# Patient Record
Sex: FEMALE | Race: WHITE
[De-identification: names, ages, dates, MRNs, and addresses within clinical notes are randomized per-mention and may not be internally consistent; named-entity substitution may affect disease eponyms.]

---

## 2017-04-25 ENCOUNTER — HOSPITAL ENCOUNTER (OUTPATIENT)
Dept: HOSPITAL 58 - SURG | Age: 72
Discharge: HOME | End: 2017-04-25
Attending: INTERNAL MEDICINE

## 2017-04-25 VITALS — SYSTOLIC BLOOD PRESSURE: 106 MMHG | DIASTOLIC BLOOD PRESSURE: 67 MMHG | TEMPERATURE: 97 F

## 2017-04-25 VITALS — BODY MASS INDEX: 38.7 KG/M2

## 2017-04-25 DIAGNOSIS — Z09: Primary | ICD-10-CM

## 2017-04-25 DIAGNOSIS — D12.3: ICD-10-CM

## 2017-04-25 DIAGNOSIS — R13.10: ICD-10-CM

## 2017-04-25 DIAGNOSIS — Z86.010: ICD-10-CM

## 2017-04-25 DIAGNOSIS — D12.0: ICD-10-CM

## 2017-04-25 DIAGNOSIS — K63.89: ICD-10-CM

## 2017-04-25 DIAGNOSIS — D17.79: ICD-10-CM

## 2017-04-26 NOTE — OP
INDICATIONS FOR PROCEDURE:  71-year-old female presents for colonoscopy. She 
has a past history of colon polyps, the last colonoscopy five years ago. She 
also has intermittent dysphagia to solid foods. She is scheduled for endoscopy 
exam. 



MEDICATIONS:  SEE ANESTHESIA NOTES.



PROCEDURE:  

1.  ENDOSCOPY, AMERICAN DILATATION

2.  COLONOSCOPY, SNARE POLYPECTOMY.



REPORT:  The risks, benefits, alternatives and limitations were discussed in 
detail with the patient.  Informed consent was obtained.



After adequate sedation was achieved, the video endoscope was introduced in the 
posterior pharynx and esophagus under direct vision and easily advanced this 
down to the second portion of the duodenum.  I then slowly withdrew.  The 
duodenal mucosa appeared unremarkable as did the duodenal bulb. The antrum and 
body were relatively unremarkable. The scope was retroflexed to look at the 
cardia and fundus which was unremarkable.  The scope was anteflexed and 
withdrawn back through the esophagus which was unremarkable. I advanced the 
scope back down the gastric lumen. I placed a guidewire and withdrew the scope. 
Over the guidewire, I easily advanced a 54 [place] American dilator. The patient 
tolerated the procedure well with stable vital signs and pulse oximetry 
throughout.



The patient's bed was turned. Digital rectal exam revealed good tone, no 
masses. The colonoscope was introduced into the rectum and advanced under 
direct visual guidance to the cecum. The cecum was identified by the 
appendiceal orifice and IC valve. In the cecum there was a benign appearing 5 
mm polyp that I removed by snare technique. I then slowly withdrew the scope in 
a circumferential manner examining the mucosa quite carefully.  I looked on the 
proximal and distal side of the folds and flexures as best as possible. I was 
able to retroflex the scope in the right colon and left colon to increase 
visualization.  Withdrawing the scope revealed 3 small lipomas in the ascending 
colon. In the distal transverse area, there was a second 5 mm semi sessile 
polyp that I removed by snare technique. There were a few diverticula in the 
left colon. There was a surgical anastomosis which appeared intact and healthy 
in the sigmoid area. No other abnormalities were noted including on retroflex 
view of the anal canal. The prep was good. The withdrawal time was 7 minutes 
and 52 seconds. The patient tolerated the procedure well with stable vital 
signs and pulse oximetry throughout. 



IMPRESSION: 

1.   Normal upper endoscopy exam. 

2.   Successful passive dilatation of the esophagus.

3.   Two (2) colonic polyps successfully removed.

4.   Few diverticula in the left colon.

5.   Three (3) small lipomas in the ascending colon. 



RECOMMENDATIONS:

1.   Strict reflux precautions. I have asked the patient to make sure she cuts 
and chews her food well and eats slowly. 

2.   Await polyp pathology; if everything is benign, I suggest repeat 
colonoscopy examination again in 5 years, sooner if there are signs or symptoms 
to indicate otherwise.

3.   Will see her back in the office as needed. 



CC:  DR. MARK ULLOA

## 2018-09-18 ENCOUNTER — HOSPITAL ENCOUNTER (OUTPATIENT)
Dept: HOSPITAL 58 - LAB | Age: 73
Discharge: HOME | End: 2018-09-18
Attending: FAMILY MEDICINE

## 2018-09-18 VITALS — BODY MASS INDEX: 38.7 KG/M2

## 2018-09-18 DIAGNOSIS — Z20.818: Primary | ICD-10-CM

## 2018-09-18 PROCEDURE — 87081 CULTURE SCREEN ONLY: CPT
